# Patient Record
Sex: FEMALE | Race: WHITE | Employment: FULL TIME | URBAN - METROPOLITAN AREA
[De-identification: names, ages, dates, MRNs, and addresses within clinical notes are randomized per-mention and may not be internally consistent; named-entity substitution may affect disease eponyms.]

---

## 2020-03-02 ENCOUNTER — APPOINTMENT (OUTPATIENT)
Dept: GENERAL RADIOLOGY | Age: 30
End: 2020-03-02
Payer: COMMERCIAL

## 2020-03-02 ENCOUNTER — HOSPITAL ENCOUNTER (EMERGENCY)
Age: 30
Discharge: HOME | End: 2020-03-02
Attending: EMERGENCY MEDICINE
Payer: COMMERCIAL

## 2020-03-02 VITALS
WEIGHT: 144.84 LBS | RESPIRATION RATE: 18 BRPM | OXYGEN SATURATION: 100 % | HEIGHT: 66 IN | TEMPERATURE: 97.1 F | HEART RATE: 85 BPM | BODY MASS INDEX: 23.28 KG/M2 | SYSTOLIC BLOOD PRESSURE: 132 MMHG | DIASTOLIC BLOOD PRESSURE: 80 MMHG

## 2020-03-02 PROCEDURE — 99283 EMERGENCY DEPT VISIT LOW MDM: CPT

## 2020-03-02 PROCEDURE — 73660 X-RAY EXAM OF TOE(S): CPT

## 2020-03-02 ASSESSMENT — PAIN DESCRIPTION - ORIENTATION: ORIENTATION: LEFT

## 2020-03-02 ASSESSMENT — PAIN DESCRIPTION - PAIN TYPE: TYPE: ACUTE PAIN

## 2020-03-02 ASSESSMENT — PAIN SCALES - GENERAL: PAINLEVEL_OUTOF10: 7

## 2020-03-03 NOTE — ED NOTES
Patient was wearing open toe shoes. She was learning a dance for her wedding. Her foot hit her soon to be husbands. She ripped off her left great toe nail.       Michael Geiger RN  03/02/20 [id number]

## 2020-03-03 NOTE — ED NOTES
Gave patient discharge instructions. She states understanding.  Patient discharged to home      Verenice Valdez RN  03/02/20 2024

## 2020-03-03 NOTE — ED NOTES
Dr. Rosenda Astorga placed left great toe nail back in bed. He placed an antibiotic ointment and band-aid on left great toe.         Kelsi Brown RN  03/02/20 [address] Aguirre RN  03/02/20 2020

## 2020-03-03 NOTE — ED PROVIDER NOTES
Emergency Physician Note    Chief Complaint  Other (ripped left foot toe nail off)       History of Present Illness  Finn Crockett is a 34 y.o. female who presents to the ED for toenail injury. Patient reports that she and her fiancé were dancing barefoot and she snagged her left great toe and it ripped up. Tetanus up-to-date. No other injuries reported. 10 systems reviewed, pertinent positives per HPI otherwise noted to be negative    I have reviewed the following from the nursing documentation:      Prior to Admission medications    Medication Sig Start Date End Date Taking? Authorizing Provider   drospirenone-ethinyl estradiol 3-0.03 MG TABS Take 1 tablet by mouth daily   Yes Historical Provider, MD       Allergies as of 03/02/2020    (No Known Allergies)       Past Medical History:   Diagnosis Date    Arrhythmia         Surgical History:   Past Surgical History:   Procedure Laterality Date    HERNIA REPAIR          Family History:  History reviewed. No pertinent family history.     Social History     Socioeconomic History    Marital status: Single     Spouse name: Not on file    Number of children: Not on file    Years of education: Not on file    Highest education level: Not on file   Occupational History    Not on file   Social Needs    Financial resource strain: Not on file    Food insecurity:     Worry: Not on file     Inability: Not on file    Transportation needs:     Medical: Not on file     Non-medical: Not on file   Tobacco Use    Smoking status: Never Smoker    Smokeless tobacco: Never Used   Substance and Sexual Activity    Alcohol use: Yes     Comment: socially    Drug use: No    Sexual activity: Yes     Partners: Male     Comment: Single   Lifestyle    Physical activity:     Days per week: Not on file     Minutes per session: Not on file    Stress: Not on file   Relationships    Social connections:     Talks on phone: Not on file     Gets together: Not on file     Attends estimate there is LOW risk for CELLULITIS, COMPARTMENT SYNDROME, NECROTIZING FASCIITIS, TENDON OR NEUROVASCULAR INJURY, or FOREIGN BODY, thus I consider the discharge disposition reasonable. Also, there is no evidence or peritonitis, sepsis, or toxicity. Rosanne Castro and I have discussed the diagnosis and risks, and we agree with discharging home to follow-up with their primary doctor. We also discussed returning to the Emergency Department immediately if new or worsening symptoms occur. We have discussed the symptoms which are most concerning (e.g., changing or worsening pain, fever, numbness, weakness, cool or painful digits) that necessitate immediate return. Final Impression    1. Toenail avulsion, initial encounter        Discharge Vital Signs:  Blood pressure 132/80, pulse 85, temperature 97.1 °F (36.2 °C), temperature source Oral, resp. rate 18, height 5' 6\" (1.676 m), weight 144 lb 13.5 oz (65.7 kg), last menstrual period 02/24/2020, SpO2 100 %, not currently breastfeeding. Patient was given scripts for the following medications. I counseled patient how to take these medications. New Prescriptions    No medications on file       Disposition  Pt is in good condition upon Discharge to home. This chart was generated using the [address] [place] [address] [address]  dictation system. I created this record but it may contain dictation errors.           Marily Barahona MD  03/02/20 [id number]